# Patient Record
Sex: MALE | Race: WHITE | URBAN - METROPOLITAN AREA
[De-identification: names, ages, dates, MRNs, and addresses within clinical notes are randomized per-mention and may not be internally consistent; named-entity substitution may affect disease eponyms.]

---

## 2023-02-28 ENCOUNTER — OFFICE VISIT (OUTPATIENT)
Dept: ORTHOPEDIC SURGERY | Age: 63
End: 2023-02-28

## 2023-02-28 DIAGNOSIS — M75.81 ROTATOR CUFF TENDINITIS, RIGHT: ICD-10-CM

## 2023-02-28 DIAGNOSIS — M25.511 RIGHT SHOULDER PAIN, UNSPECIFIED CHRONICITY: ICD-10-CM

## 2023-02-28 DIAGNOSIS — M75.01 ADHESIVE CAPSULITIS OF RIGHT SHOULDER: Primary | ICD-10-CM

## 2023-02-28 RX ORDER — MELOXICAM 15 MG/1
15 TABLET ORAL DAILY PRN
Qty: 30 TABLET | Refills: 0 | Status: SHIPPED | OUTPATIENT
Start: 2023-02-28

## 2023-02-28 NOTE — PROGRESS NOTES
ORTHOPAEDIC SURGERY H&P / CONSULTATION NOTE    Chief complaint:   Chief Complaint   Patient presents with    New Patient     Right deltoid pain       History of present illness: The patient is a 65 y.o. male right hand dominant with subjective symptoms of right shoulder pain. The chief complaint is located at lateral aspect/anterior lateral aspect right shoulder. Duration of symptoms has been for since November 2022. The severity of symptoms is rated at 6/10 pain however now down to 4/10 pain on intake form. Patient states that he reached across while driving with his right arm to pick something up and ultimately had pain thereafter. In the last 2 to 3 months he is also had a initial bout of gout for his body that was treated with Indocin and additional other medications. He is now on maintenance dosing. He denies injections in the right shoulder he denies physical therapy. He is self-pay. Denies trauma directly landing on that shoulder however he does state that he had a quick jerking movement to his body with almost slipping on the floor and he had pain. Denies instability. He takes Indocin as needed at this point    The patient has tried the below listed items prior to today's consultation for above listed chief complaint.     +   Over-the-counter anti-inflammatories/prescription medication anti-inflammatory. -   Physical therapy / guided home exercise program -     -   Previous corticosteroid injections    Past medical history:  No past medical history on file. Past surgical history:  No past surgical history on file. Allergies:  No Known Allergies      Medications:   Current Outpatient Medications:     meloxicam (MOBIC) 15 MG tablet, Take 1 tablet by mouth daily as needed for Pain, Disp: 30 tablet, Rfl: 0     Social history: Denies IV drug use.     Social History     Socioeconomic History    Marital status: Single     Spouse name: Not on file    Number of children: Not on file    Years of education: Not on file    Highest education level: Not on file   Occupational History    Not on file   Tobacco Use    Smoking status: Former     Types: Cigarettes     Quit date: 3/29/2018     Years since quittin.9    Smokeless tobacco: Not on file   Substance and Sexual Activity    Alcohol use: Not on file    Drug use: No    Sexual activity: Not on file   Other Topics Concern    Not on file   Social History Narrative    Not on file     Social Determinants of Health     Financial Resource Strain: Not on file   Food Insecurity: Not on file   Transportation Needs: Not on file   Physical Activity: Not on file   Stress: Not on file   Social Connections: Not on file   Intimate Partner Violence: Not on file   Housing Stability: Not on file     Tobacco use. Social History     Tobacco Use   Smoking Status Former    Types: Cigarettes    Quit date: 3/29/2018    Years since quittin.9   Smokeless Tobacco Not on file     Employment: Noncontributory    Workers compensation claim: Noncontributory    Review of systems: Patient denies any fevers chills chest pain shortness of breath nausea vomiting significant weight loss any change in voiding or bowel movements. Patient denies any significant numbness or tingling at baseline as it relates to this presenting symptom/chief complaint. The patient denies any significant problems with skin or any significant allergies. Physical examination:  There is no height or weight on file to calculate BMI.   AAOx3, NCAT  EOMI  MMM  RR  Unlabored breathing, no wheezing  Skin intact BUE and BLE, warm and moist  Bilateral upper extremity examination specific to subjective symptoms    Exam Right Shoulder                                                Active Range of Motion (FF/Abd/ER/IR)         140/140/20/low sacrum  Passive Range of Motion (FF/Abd/ER/IR)      same  Trace   Yamila,    positive Boss,      5/5 albeit with slight discomfort   empty Can,          5/5 ER arm at the side, 5/5   belly Press,      5/5 bear Hug,       negative O'Briens,      none TTP at Biceps Tendon Sheath,     negative Speed, negative Yergeson, none   TTP AC Joint, negative cross arm adduction,                         Skin intact throughout  5/5 D B T G IO EPL  SILT Ax, R, U, M  +2 radial pulse    Diagnostic imaging:  MY READ:  4 view right shoulder 2/28/2023: Negative fracture. Positive acromioclavicular joint arthrosis moderate. Mild glenohumeral arthrosis. Type I acromion. Pertinent lab work: None     Diagnosis Orders   1. Adhesive capsulitis of right shoulder  [facility]    meloxicam (MOBIC) 15 MG tablet      2. Rotator cuff tendinitis, right  [facility]    meloxicam (MOBIC) 15 MG tablet      3. Right shoulder pain, unspecified chronicity  XR SHOULDER RIGHT (MIN 2 VIEWS)    [facility]          Assessment and plan: 65 y.o. male with current subjective symptoms and physical exam findings with diagnostic imaging correlating to right shoulder adhesive capsulitis, rotator cuff tendinitis/bursitis. -Time of 16 minutes was spent coordinating and discussing the clinical findings, reviewing diagnostic imaging as indicated, coordinating care with prior notes review and current clinical encounter documentation as it pertains to the patient's presenting subjective symptoms and diagnoses. -I reviewed with the patient the imaging findings as well as clinical exam and  how it correlates to subjective symptoms.  -I had a pleasant discussion with the patient today. I reviewed with him that currently his clinical examination correlates above listed. I reviewed with the patient natural history evolution of adhesive capsulitis. He denies diabetes mellitus. He denies additional trauma. I reviewed with him nonoperative versus operative measures and treatment options.   Understand these he currently wishes to proceed with conservative care which is reasonable to start  -In an effort to decrease the amount of daily anti-inflammatory and frequency we will start Mobic 15 mg p.o. daily as needed pain and OTC Tylenol per bottle as needed discomfort. He may save his Indocin for when he has his gout flares  -Formal physical therapy has been prescribed to work on periscapular stretching and strengthening to also include full range of motion and pain modalities. Again I reviewed with him the natural history evolution of adhesive capsulitis and its overall resolution.  -I did offer a corticosteroid injection into point of maximal pain today. Understand the risk and benefit states he wishes to hold off. He will follow-up in 4 to 6 weeks time for consideration of a corticosteroid injection to be provided at the point of maximal pain.  -Continue activity modification to include low impact   -All questions answered to the patient's satisfaction and the patient expressed understanding and agreement with the above listed treatment plan  -Follow up in 4 to 6 weeks  -Thank you for the clinical consultation and allowing me to participate in the patient's care. Electronically signed by Axel Gray MD on 2/28/23 at 8:49 AM ESTEVAN Gray MD       Orthopaedic Surgery-Sports Medicine        Disclaimer: This note was dictated with voice recognition software. Though review and correction are routinely performed, please contact the office/medical records for any errors requiring correction.

## 2025-02-04 SDOH — HEALTH STABILITY: PHYSICAL HEALTH: ON AVERAGE, HOW MANY DAYS PER WEEK DO YOU ENGAGE IN MODERATE TO STRENUOUS EXERCISE (LIKE A BRISK WALK)?: 0 DAYS

## 2025-02-05 ENCOUNTER — OFFICE VISIT (OUTPATIENT)
Dept: FAMILY MEDICINE CLINIC | Age: 65
End: 2025-02-05

## 2025-02-05 VITALS
BODY MASS INDEX: 29.53 KG/M2 | HEART RATE: 88 BPM | DIASTOLIC BLOOD PRESSURE: 68 MMHG | OXYGEN SATURATION: 96 % | HEIGHT: 72 IN | SYSTOLIC BLOOD PRESSURE: 136 MMHG | WEIGHT: 218 LBS | TEMPERATURE: 97.5 F

## 2025-02-05 DIAGNOSIS — Z76.89 ENCOUNTER TO ESTABLISH CARE: Primary | ICD-10-CM

## 2025-02-05 DIAGNOSIS — I10 BENIGN ESSENTIAL HTN: ICD-10-CM

## 2025-02-05 DIAGNOSIS — M10.00 IDIOPATHIC GOUT, UNSPECIFIED CHRONICITY, UNSPECIFIED SITE: ICD-10-CM

## 2025-02-05 PROCEDURE — 3075F SYST BP GE 130 - 139MM HG: CPT | Performed by: STUDENT IN AN ORGANIZED HEALTH CARE EDUCATION/TRAINING PROGRAM

## 2025-02-05 PROCEDURE — 3078F DIAST BP <80 MM HG: CPT | Performed by: STUDENT IN AN ORGANIZED HEALTH CARE EDUCATION/TRAINING PROGRAM

## 2025-02-05 PROCEDURE — 99203 OFFICE O/P NEW LOW 30 MIN: CPT | Performed by: STUDENT IN AN ORGANIZED HEALTH CARE EDUCATION/TRAINING PROGRAM

## 2025-02-05 RX ORDER — ALLOPURINOL 300 MG/1
300 TABLET ORAL DAILY
COMMUNITY
Start: 2024-11-18 | End: 2025-02-05 | Stop reason: SDUPTHER

## 2025-02-05 RX ORDER — ALLOPURINOL 300 MG/1
300 TABLET ORAL DAILY
Qty: 90 TABLET | Refills: 3 | Status: SHIPPED | OUTPATIENT
Start: 2025-02-05

## 2025-02-05 RX ORDER — LOSARTAN POTASSIUM 50 MG/1
50 TABLET ORAL DAILY
COMMUNITY
Start: 2025-01-16

## 2025-02-05 RX ORDER — INDOMETHACIN 25 MG/1
CAPSULE ORAL
COMMUNITY
Start: 2024-02-04

## 2025-02-05 RX ORDER — COLCHICINE 0.6 MG/1
TABLET ORAL
COMMUNITY
Start: 2023-02-04

## 2025-02-05 SDOH — ECONOMIC STABILITY: FOOD INSECURITY: WITHIN THE PAST 12 MONTHS, YOU WORRIED THAT YOUR FOOD WOULD RUN OUT BEFORE YOU GOT MONEY TO BUY MORE.: NEVER TRUE

## 2025-02-05 SDOH — ECONOMIC STABILITY: FOOD INSECURITY: WITHIN THE PAST 12 MONTHS, THE FOOD YOU BOUGHT JUST DIDN'T LAST AND YOU DIDN'T HAVE MONEY TO GET MORE.: NEVER TRUE

## 2025-02-05 ASSESSMENT — ENCOUNTER SYMPTOMS
COUGH: 0
CONSTIPATION: 0
DIARRHEA: 0
SHORTNESS OF BREATH: 0

## 2025-02-05 ASSESSMENT — PATIENT HEALTH QUESTIONNAIRE - PHQ9
2. FEELING DOWN, DEPRESSED OR HOPELESS: NOT AT ALL
SUM OF ALL RESPONSES TO PHQ QUESTIONS 1-9: 0
SUM OF ALL RESPONSES TO PHQ9 QUESTIONS 1 & 2: 0
1. LITTLE INTEREST OR PLEASURE IN DOING THINGS: NOT AT ALL
SUM OF ALL RESPONSES TO PHQ QUESTIONS 1-9: 0

## 2025-02-05 NOTE — PROGRESS NOTES
[facility]  [address],  [place] [address]    Assessment/Plan:    Jed was seen today for new patient.    Diagnoses and all orders for this visit:    Encounter to establish care  Benign essential HTN  Stable.  Continue losartan 50 mg daily.  Will review renal function and electrolyte labs from November that patient to have sent to PCP.    Idiopathic gout, unspecified chronicity, unspecified site  -     allopurinol (ZYLOPRIM) 300 MG tablet; Take 1 tablet by mouth daily  Will review uric acid levels from most recent labs.  Pt to have labs sent over from last November.     Return in about 1 month (around 3/5/2025) for Physical.     Patient: Jed Meléndez is a pleasant 64 y.o.male who presents today for:      Chief Complaint   Patient presents with    New Patient       HPI:     Establishing care:  Prior PCP was Dr. Arteaga ([facility]. Summer 2024). Discovered [facility] via close.  Patient has been followed by     Ortho (Right Adhesive capsulitis. Seen last year.)  Rheumatology (Gout. Over a year ago)   specialists. Reports  n/a hospitalizations. [place] [facility] [place].     Hypertension:  Continue to take losartan 50 mg daily.  Denies any chest pain shortness of breath leg edema or headaches.    Patient's pertinent past medical history and medications were reviewed and updated as appropriate today.    Review of Systems:  Review of Systems   Constitutional:  Negative for fatigue and fever.   HENT:  Negative for congestion.    Respiratory:  Negative for cough and shortness of breath.    Cardiovascular:  Negative for leg swelling.   Gastrointestinal:  Negative for constipation and diarrhea.   Genitourinary:  Negative for dysuria.   Neurological:  Negative for headaches.   Psychiatric/Behavioral:  Negative for sleep disturbance. The patient is not nervous/anxious.        Objective:    Physical Exam:   Vitals:    02/05/25 0805   BP: 136/68   Site: Left Upper Arm

## 2025-03-05 ENCOUNTER — OFFICE VISIT (OUTPATIENT)
Dept: FAMILY MEDICINE CLINIC | Age: 65
End: 2025-03-05

## 2025-03-05 VITALS
TEMPERATURE: 97.7 F | DIASTOLIC BLOOD PRESSURE: 80 MMHG | SYSTOLIC BLOOD PRESSURE: 138 MMHG | OXYGEN SATURATION: 95 % | HEART RATE: 89 BPM | WEIGHT: 215 LBS | BODY MASS INDEX: 29.12 KG/M2 | HEIGHT: 72 IN

## 2025-03-05 DIAGNOSIS — Z12.11 COLON CANCER SCREENING: ICD-10-CM

## 2025-03-05 DIAGNOSIS — Z00.00 ANNUAL PHYSICAL EXAM: Primary | ICD-10-CM

## 2025-03-05 DIAGNOSIS — Z12.5 PROSTATE CANCER SCREENING: ICD-10-CM

## 2025-03-05 DIAGNOSIS — G43.009 MIGRAINE WITHOUT AURA AND WITHOUT STATUS MIGRAINOSUS, NOT INTRACTABLE: ICD-10-CM

## 2025-03-05 DIAGNOSIS — K21.9 GASTROESOPHAGEAL REFLUX DISEASE, UNSPECIFIED WHETHER ESOPHAGITIS PRESENT: ICD-10-CM

## 2025-03-05 PROCEDURE — 99396 PREV VISIT EST AGE 40-64: CPT | Performed by: STUDENT IN AN ORGANIZED HEALTH CARE EDUCATION/TRAINING PROGRAM

## 2025-03-05 PROCEDURE — 99213 OFFICE O/P EST LOW 20 MIN: CPT | Performed by: STUDENT IN AN ORGANIZED HEALTH CARE EDUCATION/TRAINING PROGRAM

## 2025-03-05 RX ORDER — RIZATRIPTAN BENZOATE 10 MG/1
10 TABLET ORAL
Qty: 9 TABLET | Refills: 0 | Status: SHIPPED | OUTPATIENT
Start: 2025-03-05 | End: 2025-03-05

## 2025-03-05 NOTE — PATIENT INSTRUCTIONS
GERD diet: avoid carbonated/acid beverages, fried and fatty foods. peppermint, chocolate, alcohol, coffee, citrus fruits and juices, tomoato products; avoid lying down for 2 to 3 hours after eating, avoid tight fitting clothing.

## 2025-03-05 NOTE — PROGRESS NOTES
Alcohol    Other Paternal Grandfather         Alcohol            Health Maintenance Screening:  Prostate cancer screening order: was provided (no personal or family history of prostate CA) today. The natural history of prostate cancer and ongoing controversy regarding screening and potential treatment outcomes of prostate cancer has been discussed with the patient. The meaning of a false positive PSA and a false negative PSA has been discussed. He indicates understanding of the limitations of this screening test and wishes to proceed with screening PSA testing. (55-69: C)  Colorectal cancer screening (Screening colonoscopy) order: was provided today. Lung Cancer Screen:was not applicable to this patient based on their risk factors and evidence based recommendations today (50 to 80 years who have a 20 pack-year smoking history and currently smoke or have quit within the past 15 years)    Immunizations:   Patient educated and aware of benefits of receiving appropriate screenings, vaccinations for above preventive health services. Counseled on increased morbidity and mortality risks of declining preventative health services.    Patient's medication history, social history, family, surgical history, medical history and routine physical labs were all reviewed at this visit.  No Known Allergies    Review of Systems:  Review of Systems    Objective:    Physical Exam:   Vitals:    03/05/25 1058   BP: 138/80   Pulse: 89   Temp: 97.7 °F (36.5 °C)   SpO2: 95%   Weight: 97.5 kg (215 lb)   Height: 1.829 m (6')      Physical Exam   Body mass index is 29.16 kg/m².    Wt Readings from Last 3 Encounters:   03/05/25 97.5 kg (215 lb)   02/05/25 98.9 kg (218 lb)   05/04/18 107 kg (236 lb)         ASSESSMENT & PLAN:    Jed Meléndez is a 64 y.o. year old male here for an annual wellness and prevention visit.     Jed was seen today for annual exam.    Diagnoses and all orders for this visit:    Annual physical exam  Age appropriate

## 2025-03-19 ENCOUNTER — TELEPHONE (OUTPATIENT)
Dept: FAMILY MEDICINE CLINIC | Age: 65
End: 2025-03-19

## 2025-03-24 ENCOUNTER — TELEMEDICINE (OUTPATIENT)
Dept: FAMILY MEDICINE CLINIC | Age: 65
End: 2025-03-24

## 2025-03-24 DIAGNOSIS — N18.31 STAGE 3A CHRONIC KIDNEY DISEASE (HCC): Primary | ICD-10-CM

## 2025-03-24 DIAGNOSIS — E78.2 MIXED HYPERLIPIDEMIA: ICD-10-CM

## 2025-03-24 DIAGNOSIS — R74.01 ELEVATED TRANSAMINASE LEVEL: ICD-10-CM

## 2025-03-24 DIAGNOSIS — R73.03 PREDIABETES: ICD-10-CM

## 2025-03-24 PROCEDURE — 99214 OFFICE O/P EST MOD 30 MIN: CPT | Performed by: STUDENT IN AN ORGANIZED HEALTH CARE EDUCATION/TRAINING PROGRAM

## 2025-03-24 NOTE — PROGRESS NOTES
Assessment/Plan:    Jed was seen today for discuss labs.    Diagnoses and all orders for this visit:    Stage 3a chronic kidney disease (HCC)  Likely chronic.  Not at goal.  Patient recent blood work showing GFR 59 from 92 in 2023.  Also with proteinuria 3+.  Discussed with patient concern for chronic kidney disease though we will repeat blood work in 2 to 3 months to confirm this.  In the interim advised patient to refrain from NSAIDs.    Will continue to optimize risk factors including adequate blood sugar and blood pressure control.  Uric acid 5.0.    Mixed hyperlipidemia  Chronic not at goal  10-year ASCVD risk 36.6%    Discussed ASCVD risk  Patient would like to use lifestyle modifications to manage elevated cholesterol levels and repeat blood test in 2 to 3 months.  Discussed appropriate diet modifications and regular aerobic exercise  Patient to have blood work done with [facility].    Elevated transaminase level  Discussed that elevated liver enzymes likely secondary to elevated cholesterol levels.  Will continue to monitor with repeat blood work.    Prediabetes  A1c 6.0  Appropriate diet and lifestyle modifications counseling provided.  Will continue to follow for this.    No follow-ups on file.      Patient: Jed Meléndez is a 64 y.o.male who presents today for:   Chief Complaint   Patient presents with    Discuss Labs     HPI:     Lab work:  Following up to discuss blood work.  No new symptoms or concerns at this time.      Patient's pertinent past medical history,surgical history, family history, medications, and allergies  were reviewed and updated as appropriate today.    Review of Systems         [ INSTRUCTIONS:  \"[x]\" Indicates a positive item  \"[]\" Indicates a negative item  -- DELETE ALL ITEMS NOT EXAMINED]  [x] Alert  [x] Oriented to person/place/time    [x] No apparent distress  []  Appears Unwell:     [x] Breathing appears normal  [] Appears tachypneic      [x] Rash on visible

## 2025-06-13 DIAGNOSIS — N18.31 STAGE 3A CHRONIC KIDNEY DISEASE (HCC): ICD-10-CM

## 2025-06-13 DIAGNOSIS — Z12.5 PROSTATE CANCER SCREENING: ICD-10-CM

## 2025-06-13 DIAGNOSIS — E78.2 MIXED HYPERLIPIDEMIA: ICD-10-CM

## 2025-06-13 LAB
ALBUMIN SERPL-MCNC: 4.2 G/DL (ref 3.4–5)
ALBUMIN/GLOB SERPL: 1.6 {RATIO} (ref 1.1–2.2)
ALP SERPL-CCNC: 72 U/L (ref 40–129)
ALT SERPL-CCNC: 29 U/L (ref 10–40)
ANION GAP SERPL CALCULATED.3IONS-SCNC: 10 MMOL/L (ref 3–16)
AST SERPL-CCNC: 21 U/L (ref 15–37)
BILIRUB SERPL-MCNC: 0.5 MG/DL (ref 0–1)
BUN SERPL-MCNC: 20 MG/DL (ref 7–20)
CALCIUM SERPL-MCNC: 9.5 MG/DL (ref 8.3–10.6)
CHLORIDE SERPL-SCNC: 105 MMOL/L (ref 99–110)
CHOLEST SERPL-MCNC: 194 MG/DL (ref 0–199)
CO2 SERPL-SCNC: 26 MMOL/L (ref 21–32)
CREAT SERPL-MCNC: 1.1 MG/DL (ref 0.8–1.3)
CREAT UR-MCNC: 74.7 MG/DL (ref 39–259)
GFR SERPLBLD CREATININE-BSD FMLA CKD-EPI: 75 ML/MIN/{1.73_M2}
GLUCOSE SERPL-MCNC: 107 MG/DL (ref 70–99)
HDLC SERPL-MCNC: 30 MG/DL (ref 40–60)
LDLC SERPL CALC-MCNC: 112 MG/DL
MICROALBUMIN UR DL<=1MG/L-MCNC: 44.9 MG/DL
MICROALBUMIN/CREAT UR: 601.1 MG/G (ref 0–30)
POTASSIUM SERPL-SCNC: 4.4 MMOL/L (ref 3.5–5.1)
PROT SERPL-MCNC: 6.8 G/DL (ref 6.4–8.2)
PSA SERPL DL<=0.01 NG/ML-MCNC: 0.73 NG/ML (ref 0–4)
SODIUM SERPL-SCNC: 141 MMOL/L (ref 136–145)
TRIGL SERPL-MCNC: 258 MG/DL (ref 0–150)
VLDLC SERPL CALC-MCNC: 52 MG/DL

## 2025-06-14 ENCOUNTER — RESULTS FOLLOW-UP (OUTPATIENT)
Dept: FAMILY MEDICINE CLINIC | Age: 65
End: 2025-06-14

## 2025-06-17 ENCOUNTER — OFFICE VISIT (OUTPATIENT)
Dept: FAMILY MEDICINE CLINIC | Age: 65
End: 2025-06-17

## 2025-06-17 VITALS
WEIGHT: 201 LBS | HEIGHT: 72 IN | OXYGEN SATURATION: 98 % | BODY MASS INDEX: 27.22 KG/M2 | DIASTOLIC BLOOD PRESSURE: 62 MMHG | SYSTOLIC BLOOD PRESSURE: 100 MMHG | HEART RATE: 94 BPM

## 2025-06-17 DIAGNOSIS — R73.03 PREDIABETES: Primary | ICD-10-CM

## 2025-06-17 DIAGNOSIS — M10.00 IDIOPATHIC GOUT, UNSPECIFIED CHRONICITY, UNSPECIFIED SITE: ICD-10-CM

## 2025-06-17 DIAGNOSIS — N18.2 CKD (CHRONIC KIDNEY DISEASE), STAGE II: ICD-10-CM

## 2025-06-17 DIAGNOSIS — G43.009 MIGRAINE WITHOUT AURA AND WITHOUT STATUS MIGRAINOSUS, NOT INTRACTABLE: ICD-10-CM

## 2025-06-17 PROCEDURE — 99214 OFFICE O/P EST MOD 30 MIN: CPT | Performed by: STUDENT IN AN ORGANIZED HEALTH CARE EDUCATION/TRAINING PROGRAM

## 2025-06-17 RX ORDER — ALLOPURINOL 300 MG/1
300 TABLET ORAL DAILY
Qty: 90 TABLET | Refills: 3 | Status: SHIPPED | OUTPATIENT
Start: 2025-06-17

## 2025-06-17 NOTE — TELEPHONE ENCOUNTER
Last Office Visit  -  6/17/25  Next Office Visit  -  7/16/25    Last Filled  -    Last UDS -    Contract -

## 2025-06-17 NOTE — PROGRESS NOTES
[facility]  [address],  [place] [address]    Assessment/Plan:    There are no diagnoses linked to this encounter.    Assessment & Plan  1. Prediabetes.  - Glucose level of 107 indicates a prediabetic state.  - Managing condition with keto diet and intermittent fasting, resulting in a weight loss of 14 pounds since the last visit.  - Advised to continue current diet and exercise regimen.  - Potential use of low-dose metformin discussed for proactive blood sugar management.    2. Migraine.  Chronic not at goal  - Not using rizatriptan due to continue due to concerns about   potential side effects.  - Reassured that rizatriptan is not harmful to kidneys.  - Non-medical interventions for migraine management discussed.    3. Chronic kidney disease.  - GFR stable at 75, uric acid levels within normal range.  - Microalbuminuria noted.  - Stage II with severe microalbuminuria  - Advised to avoid NSAIDs and continue current medication regimen, including losartan.  - Potential use of Jardiance (empagliflozin) for kidney protection discussed; prescription for one-month supply at 10 mg provided. Appropriate counseling for side effects of this medication provided        Follow-up  - Follow-up in 1 month.    Wt Readings from Last 3 Encounters:   06/17/25 91.2 kg (201 lb)   03/05/25 97.5 kg (215 lb)   02/05/25 98.9 kg (218 lb)         No follow-ups on file. Nocturia, migraine, CKD.    Patient: Jed Meléndez is a pleasant 64 y.o.male who presents today for:      Chief Complaint   Patient presents with    Migraine     nystagmus       HPI:     History of Present Illness  The patient is a 64-year-old male presenting for a follow-up visit.    He expresses concern over his recent lab results, particularly a glucose level of 107 after an 18-hour fast. Despite adhering to a keto diet and intermittent fasting, he suspects persistent insulin resistance. He has been following a keto diet for